# Patient Record
Sex: MALE | Race: BLACK OR AFRICAN AMERICAN | NOT HISPANIC OR LATINO | Employment: UNEMPLOYED | URBAN - METROPOLITAN AREA
[De-identification: names, ages, dates, MRNs, and addresses within clinical notes are randomized per-mention and may not be internally consistent; named-entity substitution may affect disease eponyms.]

---

## 2021-07-01 ENCOUNTER — PATIENT MESSAGE (OUTPATIENT)
Dept: ADMINISTRATIVE | Facility: OTHER | Age: 41
End: 2021-07-01

## 2022-08-19 PROBLEM — I10 ESSENTIAL HYPERTENSION: Status: ACTIVE | Noted: 2022-08-19

## 2022-12-23 ENCOUNTER — HOSPITAL ENCOUNTER (EMERGENCY)
Facility: HOSPITAL | Age: 42
Discharge: HOME | End: 2022-12-23
Attending: EMERGENCY MEDICINE

## 2022-12-23 VITALS
BODY MASS INDEX: 29.62 KG/M2 | RESPIRATION RATE: 18 BRPM | WEIGHT: 200 LBS | DIASTOLIC BLOOD PRESSURE: 79 MMHG | OXYGEN SATURATION: 99 % | HEIGHT: 69 IN | TEMPERATURE: 99 F | SYSTOLIC BLOOD PRESSURE: 158 MMHG | HEART RATE: 65 BPM

## 2022-12-23 DIAGNOSIS — S21.212A LACERATION OF LEFT SIDE OF BACK, INITIAL ENCOUNTER: ICD-10-CM

## 2022-12-23 DIAGNOSIS — S41.112A ARM LACERATION, LEFT, INITIAL ENCOUNTER: Primary | ICD-10-CM

## 2022-12-23 PROCEDURE — 25000003 PHARM REV CODE 250: Performed by: EMERGENCY MEDICINE

## 2022-12-23 PROCEDURE — 12002 RPR S/N/AX/GEN/TRNK2.6-7.5CM: CPT

## 2022-12-23 PROCEDURE — 12001 RPR S/N/AX/GEN/TRNK 2.5CM/<: CPT

## 2022-12-23 PROCEDURE — 96360 HYDRATION IV INFUSION INIT: CPT

## 2022-12-23 PROCEDURE — 99284 EMERGENCY DEPT VISIT MOD MDM: CPT | Mod: 25

## 2022-12-23 RX ORDER — MUPIROCIN 20 MG/G
1 OINTMENT TOPICAL
Status: DISCONTINUED | OUTPATIENT
Start: 2022-12-23 | End: 2022-12-23

## 2022-12-23 RX ORDER — LIDOCAINE HYDROCHLORIDE 10 MG/ML
10 INJECTION INFILTRATION; PERINEURAL
Status: COMPLETED | OUTPATIENT
Start: 2022-12-23 | End: 2022-12-23

## 2022-12-23 RX ORDER — CLINDAMYCIN HYDROCHLORIDE 150 MG/1
450 CAPSULE ORAL EVERY 6 HOURS
Qty: 84 CAPSULE | Refills: 0 | Status: SHIPPED | OUTPATIENT
Start: 2022-12-23 | End: 2022-12-30

## 2022-12-23 RX ORDER — CLINDAMYCIN HYDROCHLORIDE 300 MG/1
CAPSULE ORAL
Status: DISPENSED
Start: 2022-12-23 | End: 2022-12-23

## 2022-12-23 RX ORDER — CLINDAMYCIN HYDROCHLORIDE 300 MG/1
300 CAPSULE ORAL
Status: COMPLETED | OUTPATIENT
Start: 2022-12-23 | End: 2022-12-23

## 2022-12-23 RX ADMIN — CLINDAMYCIN HYDROCHLORIDE 300 MG: 300 CAPSULE ORAL at 02:12

## 2022-12-23 RX ADMIN — LIDOCAINE HYDROCHLORIDE 10 ML: 10 INJECTION, SOLUTION INFILTRATION; PERINEURAL at 01:12

## 2022-12-23 RX ADMIN — NEOMYCIN AND POLYMYXIN B SULFATES AND BACITRACIN ZINC 1 EACH: 400; 3.5; 5 OINTMENT TOPICAL at 02:12

## 2022-12-23 RX ADMIN — SODIUM CHLORIDE 1000 ML: 9 INJECTION, SOLUTION INTRAVENOUS at 01:12

## 2022-12-23 NOTE — ED PROVIDER NOTES
Encounter Date: 12/23/2022       History     Chief Complaint   Patient presents with    Laceration     Pt presents to the ER w/ complaints of multiple lacerations to his L upper arm and L back. Pt states that he working in his shed, tripped and fell on a pitchfork in his shed. Upon arrival to the ER pt is actively bleeding from his arm.      42-year-old male with a history of HIV and hepatitis B comes in complaining of lacerations to his left upper arm and left back.  These were sustained just prior to arrival.  He reports that prior to arrival he went into his shed and had a mechanical trip and fall and landed on a clean new put pitchfork in his shed.  No syncope. His tetanus is up-to-date.  He did not have a head injury.  No loss of consciousness.  Arm laceration actively bleeding upon arrival but not pulsatile, extremity is neurovascularly intact.      Review of patient's allergies indicates:  No Known Allergies  Past Medical History:   Diagnosis Date    Human immunodeficiency virus (HIV) disease     Viral hepatitis B without mention of hepatic coma, acute or unspecified, without mention of hepatitis delta      No past surgical history on file.  Family History   Problem Relation Age of Onset    No Known Problems Mother     Cancer Father 50        colon    No Known Problems Brother     No Known Problems Daughter     No Known Problems Son     No Known Problems Son     No Known Problems Daughter     No Known Problems Daughter     No Known Problems Daughter     No Known Problems Daughter     No Known Problems Brother     No Known Problems Brother     No Known Problems Brother     No Known Problems Son      Social History     Tobacco Use    Smoking status: Never    Smokeless tobacco: Never   Substance Use Topics    Alcohol use: No     Alcohol/week: 0.0 standard drinks    Drug use: No     Review of Systems   Constitutional:  Negative for fever.   HENT:  Negative for sore throat.    Respiratory:  Negative for shortness  of breath.    Cardiovascular:  Negative for chest pain.   Gastrointestinal:  Negative for nausea.   Genitourinary:  Negative for dysuria.   Musculoskeletal:  Negative for back pain.   Skin:  Positive for wound. Negative for rash.   Neurological:  Negative for weakness.   Hematological:  Does not bruise/bleed easily.   All other systems reviewed and are negative.    Physical Exam     Initial Vitals [12/23/22 0141]   BP Pulse Resp Temp SpO2   (!) 164/82 69 20 98.6 °F (37 °C) 98 %      MAP       --         Physical Exam    Nursing note and vitals reviewed.  Constitutional: He appears well-developed and well-nourished. He is not diaphoretic.   HENT:   Head: Normocephalic and atraumatic.   Eyes: EOM are normal. Pupils are equal, round, and reactive to light.   Neck: Neck supple.   Normal range of motion.  Cardiovascular:  Normal rate and regular rhythm.           Pulmonary/Chest: Breath sounds normal. No respiratory distress. He has no wheezes.   Abdominal: Abdomen is soft. Bowel sounds are normal. There is no abdominal tenderness.   Musculoskeletal:         General: No tenderness or edema. Normal range of motion.      Cervical back: Normal range of motion and neck supple.     Neurological: He is alert and oriented to person, place, and time. He has normal strength. No cranial nerve deficit or sensory deficit.   Skin: Skin is warm and dry. Capillary refill takes less than 2 seconds.   Approximately 2.5 cm linear laceration to left upper arm and approximately 2 cm laceration to left upper back; both approximately 2.5mm deep   Psychiatric: He has a normal mood and affect. Thought content normal.       ED Course   Lac Repair    Date/Time: 12/23/2022 2:49 AM  Performed by: Ivelisse Samuels MD  Authorized by: Ivelisse Samuels MD     Universal protocol:     Patient identity confirmed:  Verbally with patient  Anesthesia:     Anesthesia method:  Local infiltration    Local anesthetic:  Lidocaine 1% w/o epi  Laceration details:      Location:  Shoulder/arm    Shoulder/arm location:  L upper arm (and left upper back)    Wound length (cm): Approximately 2.5 cm linear laceration to left upper arm and approximately 2 cm laceration to left upper back.    Laceration depth: both approximately 2.5mm deep.  Pre-procedure details:     Preparation:  Patient was prepped and draped in usual sterile fashion  Exploration:     Limited defect created (wound extended): no      Hemostasis achieved with:  Direct pressure (silver nitrate)    Imaging outcome: foreign body not noted      Wound exploration: wound explored through full range of motion and entire depth of wound visualized      Wound extent: no nerve damage noted and no tendon damage noted      Contaminated: no    Treatment:     Area cleansed with:  Povidone-iodine    Amount of cleaning:  Standard    Visualized foreign bodies/material removed: no      Debridement:  None  Skin repair:     Repair method:  Sutures    Suture size:  4-0    Suture material:  Prolene    Suture technique:  Simple interrupted    Number of sutures:  12  Approximation:     Approximation:  Close  Repair type:     Repair type:  Simple  Post-procedure details:     Dressing:  Antibiotic ointment, non-adherent dressing and sterile dressing    Procedure completion:  Tolerated well, no immediate complications  Labs Reviewed - No data to display       Imaging Results    None          Medications   sodium chloride 0.9% bolus 1,000 mL 1,000 mL (1,000 mLs Intravenous New Bag 12/23/22 0151)   mupirocin 2 % ointment 22 g (has no administration in time range)   clindamycin capsule 300 mg (has no administration in time range)   LIDOcaine HCL 10 mg/ml (1%) injection 10 mL (10 mLs Infiltration Given 12/23/22 0149)   neomycin-bacitracnZn-polymyxnB packet (1 each Topical (Top) Given 12/23/22 0222)                Attending Attestation:             Attending ED Notes:   42-year-old male with a history of HIV and hepatitis B comes in complaining of  lacerations to his left upper arm and left back.  These were sustained just prior to arrival.  He reports that prior to arrival he went into his shed and had a mechanical trip and fall and landed on a clean new put pitchfork in his shed.  No syncope. Tetanus is up-to-date.  He did not have a head injury.  No loss of consciousness.  Arm laceration actively bleeding upon arrival but not pulsatile, extremity is neurovascularly intact.      Both lacerations cleaned and repaired without difficulty. Patient is non-toxic appearing, in no acute distress, and vital signs are stable and normal upon discharge. Upon completion of ED evaluation and management, with consideration of thorough differential diagnosis, the patient was found to have no acutely abnormal physical exam findings or other pathology requiring further emergent intervention or admission at this time. Patient/caregiver has no complaints upon discharge and verbalizes understanding and agreement with diagnosis and treatment plan. Discharge instructions with return precautions provided. Patient/caregiver verbalizes understanding to return to ED immediately for any new or worsening symptoms and to follow up with PCP/specialist recommended in 1-2 days.                            Clinical Impression:   Final diagnoses:  [S41.112A] Arm laceration, left, initial encounter (Primary)  [S21.212A] Laceration of left side of back, initial encounter        ED Disposition Condition    Discharge Stable          ED Prescriptions       Medication Sig Dispense Start Date End Date Auth. Provider    clindamycin (CLEOCIN) 150 MG capsule Take 3 capsules (450 mg total) by mouth every 6 (six) hours. for 7 days 84 capsule 12/23/2022 12/30/2022 Ivelisse Samuels MD          Follow-up Information       Follow up With Specialties Details Why Contact Info Additional Information    Ching Jackson MD Infectious Diseases, Obstetrics and Gynecology Schedule an appointment as soon as possible  for a visit  For wound re-check, For suture removal [address]  [place] [address]  [phone]       [facility] Emergency Department Emergency Medicine Go to  As needed, If symptoms worsen [address] [place] [address] [id number] [phone] Floor [address]             Ivelisse Samuels MD  12/23/22 0306       Ivelisse Samuels MD  12/23/22 0327

## 2022-12-30 ENCOUNTER — HOSPITAL ENCOUNTER (EMERGENCY)
Facility: HOSPITAL | Age: 42
Discharge: HOME | End: 2022-12-30
Attending: EMERGENCY MEDICINE

## 2022-12-30 VITALS
HEART RATE: 70 BPM | BODY MASS INDEX: 29.39 KG/M2 | DIASTOLIC BLOOD PRESSURE: 96 MMHG | SYSTOLIC BLOOD PRESSURE: 143 MMHG | OXYGEN SATURATION: 99 % | TEMPERATURE: 98 F | WEIGHT: 199 LBS | RESPIRATION RATE: 16 BRPM

## 2022-12-30 DIAGNOSIS — L08.9 WOUND INFECTION: ICD-10-CM

## 2022-12-30 DIAGNOSIS — Z48.02 ENCOUNTER FOR REMOVAL OF SUTURES: Primary | ICD-10-CM

## 2022-12-30 DIAGNOSIS — T14.8XXA WOUND INFECTION: ICD-10-CM

## 2022-12-30 PROCEDURE — 25000003 PHARM REV CODE 250: Performed by: NURSE PRACTITIONER

## 2022-12-30 PROCEDURE — 63600175 PHARM REV CODE 636 W HCPCS: Performed by: NURSE PRACTITIONER

## 2022-12-30 PROCEDURE — 96372 THER/PROPH/DIAG INJ SC/IM: CPT | Performed by: NURSE PRACTITIONER

## 2022-12-30 PROCEDURE — 99284 EMERGENCY DEPT VISIT MOD MDM: CPT | Mod: 25

## 2022-12-30 RX ORDER — MUPIROCIN 20 MG/G
1 OINTMENT TOPICAL
Status: COMPLETED | OUTPATIENT
Start: 2022-12-30 | End: 2022-12-30

## 2022-12-30 RX ORDER — CEFTRIAXONE 1 G/1
1 INJECTION, POWDER, FOR SOLUTION INTRAMUSCULAR; INTRAVENOUS
Status: COMPLETED | OUTPATIENT
Start: 2022-12-30 | End: 2022-12-30

## 2022-12-30 RX ORDER — MUPIROCIN 20 MG/G
OINTMENT TOPICAL 3 TIMES DAILY
Qty: 22 G | Refills: 0 | Status: SHIPPED | OUTPATIENT
Start: 2022-12-30 | End: 2023-01-09

## 2022-12-30 RX ORDER — LIDOCAINE HYDROCHLORIDE 10 MG/ML
1 INJECTION INFILTRATION; PERINEURAL
Status: COMPLETED | OUTPATIENT
Start: 2022-12-30 | End: 2022-12-30

## 2022-12-30 RX ORDER — DOXYCYCLINE 100 MG/1
100 CAPSULE ORAL 2 TIMES DAILY
Qty: 20 CAPSULE | Refills: 0 | Status: SHIPPED | OUTPATIENT
Start: 2022-12-30 | End: 2023-01-09

## 2022-12-30 RX ADMIN — MUPIROCIN 22 G: 20 OINTMENT TOPICAL at 06:12

## 2022-12-30 RX ADMIN — CEFTRIAXONE SODIUM 1 G: 1 INJECTION, POWDER, FOR SOLUTION INTRAMUSCULAR; INTRAVENOUS at 06:12

## 2022-12-30 RX ADMIN — LIDOCAINE HYDROCHLORIDE 1 ML: 10 INJECTION, SOLUTION INFILTRATION; PERINEURAL at 06:12

## 2022-12-30 NOTE — Clinical Note
"Augustin Crowell" Dwayne was seen and treated in our emergency department on 12/30/2022.  He may return to work on 01/01/2023.       If you have any questions or concerns, please don't hesitate to call.      Corazon Gamboa NP"

## 2022-12-31 NOTE — ED PROVIDER NOTES
"Encounter Date: 12/30/2022       History     Chief Complaint   Patient presents with    Suture / Staple Removal     Pt here to get stitches removed.      This is a 42-year-old black male with a history of HIV who presents to the emergency department for suture removal.  Approximately 8 days ago patient had sutures placed to 2 separate lacerations sustained after accidentally falling on a pitchfork in his shed.  Patient reports tenderness to the wound on the left upper arm.  States that he has been cleaning the wounds "whenever he can "and reports taking prescribed antibiotics.  He denies fever, malaise, or vomiting.    Review of patient's allergies indicates:  No Known Allergies  Past Medical History:   Diagnosis Date    Human immunodeficiency virus (HIV) disease     Viral hepatitis B without mention of hepatic coma, acute or unspecified, without mention of hepatitis delta      History reviewed. No pertinent surgical history.  Family History   Problem Relation Age of Onset    No Known Problems Mother     Cancer Father 50        colon    No Known Problems Brother     No Known Problems Daughter     No Known Problems Son     No Known Problems Son     No Known Problems Daughter     No Known Problems Daughter     No Known Problems Daughter     No Known Problems Daughter     No Known Problems Brother     No Known Problems Brother     No Known Problems Brother     No Known Problems Son      Social History     Tobacco Use    Smoking status: Never    Smokeless tobacco: Never   Substance Use Topics    Alcohol use: No     Alcohol/week: 0.0 standard drinks    Drug use: No     Review of Systems   Constitutional:  Negative for fever.   Gastrointestinal:  Negative for vomiting.   Musculoskeletal:  Negative for myalgias.   Skin:  Positive for wound.     Physical Exam     Initial Vitals [12/30/22 1748]   BP Pulse Resp Temp SpO2   (!) 143/96 70 16 98.4 °F (36.9 °C) 99 %      MAP       --         Physical Exam    Nursing note and " vitals reviewed.  Constitutional: He appears well-developed and well-nourished. He is active. No distress.   HENT:   Head: Normocephalic and atraumatic.   Eyes: EOM are normal. Pupils are equal, round, and reactive to light.   Neck: Neck supple.   Normal range of motion.  Cardiovascular:  Normal rate.           Pulmonary/Chest: No respiratory distress.   Musculoskeletal:         General: Normal range of motion.      Cervical back: Normal range of motion and neck supple.     Neurological: He is alert and oriented to person, place, and time. GCS score is 15. GCS eye subscore is 4. GCS verbal subscore is 5. GCS motor subscore is 6.   Skin: Skin is warm and dry. Capillary refill takes less than 2 seconds.   There are large Band-Aids placed over both lacerations that appear somewhat contaminated and skin is moist.   Psychiatric: He has a normal mood and affect. His behavior is normal. Thought content normal.       ED Course   Suture Removal    Date/Time: 12/30/2022 6:51 PM  Location procedure was performed: [facility] EMERGENCY DEPARTMENT  Performed by: Corazon Gamboa NP  Authorized by: Bakari Hernández MD   Body area: upper extremity  Location details: left upper arm  Wound Appearance: tender, draining, erythematous and purulent  Sutures Removed: 6  Post-removal: antibiotic ointment applied  Facility: sutures placed in this facility  Patient tolerance: Patient tolerated the procedure well with no immediate complications  Comments: Some areas of the laceration appeared to be slightly dehisced with yellow purulent drainage and surrounding erythema.        Suture Removal    Date/Time: 12/30/2022 6:52 PM  Location procedure was performed: [facility] EMERGENCY DEPARTMENT  Performed by: Corazon Gamboa NP  Authorized by: Bakari Hernández MD   Body area: trunk  Location details: back  Wound Appearance: normal color, draining, purulent and nontender  Sutures Removed: 6  Post-removal: antibiotic ointment applied  Facility:  sutures placed in this facility  Patient tolerance: Patient tolerated the procedure well with no immediate complications  Comments: Scant amount yellow purulent drainage noted, however no surrounding induration or erythema.      Labs Reviewed - No data to display       Imaging Results    None          Medications   mupirocin 2 % ointment 22 g (22 g Topical (Top) Given 12/30/22 1831)   cefTRIAXone injection 1 g (1 g Intramuscular Given 12/30/22 1831)   LIDOcaine HCL 10 mg/ml (1%) injection 1 mL (1 mL Other Given 12/30/22 1831)                              Clinical Impression:   Final diagnoses:  [Z48.02] Encounter for removal of sutures (Primary)  [T14.8XXA, L08.9] Wound infection        ED Disposition Condition    Discharge Stable          ED Prescriptions       Medication Sig Dispense Start Date End Date Auth. Provider    doxycycline (VIBRAMYCIN) 100 MG Cap Take 1 capsule (100 mg total) by mouth 2 (two) times daily. for 10 days 20 capsule 12/30/2022 1/9/2023 Corazon Gamboa NP    mupirocin (BACTROBAN) 2 % ointment Apply topically 3 (three) times daily. for 10 days 22 g 12/30/2022 1/9/2023 Corazon Gamboa NP          Follow-up Information       Follow up With Specialties Details Why Contact Info    Ching Jackson MD Infectious Diseases, Obstetrics and Gynecology Schedule an appointment as soon as possible for a visit in 2 days for re-evaluation of today's complaint [address] [facility] [place] [address]  [phone]               Corazon Gamboa NP  12/30/22 [id number]

## 2022-12-31 NOTE — DISCHARGE INSTRUCTIONS
Thoroughly wash wounds twice daily with Dial soap and water.  Keep wounds clean and dry.  Apply antibiotic ointment as directed.  Be sure to finish all antibiotic pills.  Follow-up with your primary doctor in 1-2 days for wound check and return to the emergency room for worsening condition.